# Patient Record
Sex: FEMALE | Race: WHITE | URBAN - NONMETROPOLITAN AREA
[De-identification: names, ages, dates, MRNs, and addresses within clinical notes are randomized per-mention and may not be internally consistent; named-entity substitution may affect disease eponyms.]

---

## 2021-05-18 ENCOUNTER — APPOINTMENT (RX ONLY)
Dept: URBAN - NONMETROPOLITAN AREA CLINIC 4 | Facility: CLINIC | Age: 31
Setting detail: DERMATOLOGY
End: 2021-05-18

## 2021-05-18 VITALS — WEIGHT: 175 LBS | HEIGHT: 66 IN

## 2021-05-18 DIAGNOSIS — L67.8 OTHER HAIR COLOR AND HAIR SHAFT ABNORMALITIES: ICD-10-CM

## 2021-05-18 PROCEDURE — 99202 OFFICE O/P NEW SF 15 MIN: CPT

## 2021-05-18 PROCEDURE — ? PATIENT SPECIFIC COUNSELING

## 2021-05-18 NOTE — HPI: HAIR OTHER
How Did This Hair Complaint Occur?: gradual in onset
Additional History: The patient also reports that her hair is extremely slow-growing. She experiences mild itching and flaking. She has had her thyroid levels checked by her primary care doctor, which were within normal limits. She is also taking prenatal vitamins daily. She reports no history of major dental issues and no known genetic hair disorder. She feels well overall.

## 2021-05-18 NOTE — PROCEDURE: PATIENT SPECIFIC COUNSELING
-The patient was counseled that brittle hair can be due to external factors that damage the hair shaft, such as heat, blow drying, and hair styling. She does frequently use a flat iron on the hair of the frontal scalp. Recent CBC, CMP, and thyroid studies unremarkable. \\n\\nPlan:\\n\\n- Check ferritin, patient will take lab slip to lab to have drawn\\n- Recommended biotin supplementation, per package instructions\\n- Discussed avoiding external exacerbating factors such as dyes, blow drying and flat iron use\\n- Continued follow-up with PCP for general medical evaluations and physical examinations recommended\\n-Patient will RTC should her condition worsen, or should additional symptoms or findings develop
Detail Level: Zone